# Patient Record
Sex: FEMALE | Race: WHITE | Employment: FULL TIME | URBAN - METROPOLITAN AREA
[De-identification: names, ages, dates, MRNs, and addresses within clinical notes are randomized per-mention and may not be internally consistent; named-entity substitution may affect disease eponyms.]

---

## 2020-07-01 ENCOUNTER — HOSPITAL ENCOUNTER (EMERGENCY)
Facility: CLINIC | Age: 50
Discharge: HOME | End: 2020-07-01
Attending: EMERGENCY MEDICINE
Payer: COMMERCIAL

## 2020-07-01 VITALS
HEART RATE: 104 BPM | TEMPERATURE: 98.3 F | HEIGHT: 63 IN | SYSTOLIC BLOOD PRESSURE: 143 MMHG | OXYGEN SATURATION: 100 % | RESPIRATION RATE: 14 BRPM | WEIGHT: 144 LBS | DIASTOLIC BLOOD PRESSURE: 99 MMHG | BODY MASS INDEX: 25.52 KG/M2

## 2020-07-01 PROCEDURE — 99283 EMERGENCY DEPT VISIT LOW MDM: CPT

## 2020-07-01 PROCEDURE — 6370000000 HC RX 637 (ALT 250 FOR IP): Performed by: EMERGENCY MEDICINE

## 2020-07-01 RX ORDER — ACETAMINOPHEN 120 MG/1
120 SUPPOSITORY RECTAL EVERY 4 HOURS PRN
COMMUNITY

## 2020-07-01 RX ORDER — PSEUDOEPHEDRINE HCL 30 MG/5 ML
15 LIQUID (ML) ORAL 4 TIMES DAILY PRN
COMMUNITY

## 2020-07-01 RX ORDER — IBUPROFEN 200 MG
400 TABLET ORAL EVERY 6 HOURS PRN
COMMUNITY

## 2020-07-01 RX ORDER — LORATADINE 10 MG/1
10 CAPSULE, LIQUID FILLED ORAL DAILY
COMMUNITY

## 2020-07-01 RX ADMIN — FLUORESCEIN SODIUM 1 EACH: 0.6 STRIP OPHTHALMIC at 20:10

## 2020-07-01 ASSESSMENT — PAIN DESCRIPTION - DESCRIPTORS: DESCRIPTORS: SHARP;JABBING

## 2020-07-01 ASSESSMENT — PAIN DESCRIPTION - PAIN TYPE: TYPE: ACUTE PAIN

## 2020-07-01 ASSESSMENT — PAIN DESCRIPTION - ORIENTATION: ORIENTATION: RIGHT

## 2020-07-01 ASSESSMENT — PAIN DESCRIPTION - FREQUENCY: FREQUENCY: INTERMITTENT

## 2020-07-01 ASSESSMENT — VISUAL ACUITY
OU: 20/20
OS: 20/30
OD: 20/40

## 2020-07-01 ASSESSMENT — PAIN SCALES - GENERAL: PAINLEVEL_OUTOF10: 3

## 2020-07-01 ASSESSMENT — PAIN DESCRIPTION - LOCATION: LOCATION: EYE

## 2020-07-02 NOTE — ED PROVIDER NOTES
eMERGENCY dEPARTMENT eNCOUnter      Pt Name: Juan Connolly  MRN: [id number]  [place] 1970  Date of evaluation: 7/1/2020      CHIEF COMPLAINT       Chief Complaint   Patient presents with   [address] [facility]    Juan Connolly is a 44 y.o. female who presents with right eye pain. Patient states about an hour and a half prior presentation. She had been pulling out the sole and cactus cleaning up the yard. She states she was sweating a lot abrupt her forearm against her brow of her head. She states she felt something was in her eye at that time she had irrigated her eye at home. She still states on occasion when she looks a certain direction or moves a certain direction with her eyes. She feels like there is something there, although generally she is not having pain. No blurry vision or double vision. She was wearing contacts at the time, which she has taken out        REVIEW OF SYSTEMS       Positive for right thigh pain. Negative for vision, double vision     PAST MEDICAL HISTORY    has a past medical history of H/O lumbar discectomy, Kidney stone, and Melanoma ([facility] [address] SURGICAL HISTORY      has a past surgical history that includes Hysterectomy; Finger amputation; and Tunneled venous port placement.     CURRENT MEDICATIONS       Discharge Medication List as of 7/1/2020  8:14 PM      CONTINUE these medications which have NOT CHANGED    Details   acetaminophen (TYLENOL) 120 MG suppository Place 120 mg rectally every 4 hours as needed for FeverHistorical Med      ibuprofen (ADVIL;MOTRIN) 200 MG tablet Take 400 mg by mouth every 6 hours as needed for PainHistorical Med      loratadine (CLARITIN) 10 MG capsule Take 10 mg by mouth dailyHistorical Med      pseudoephedrine (SUDAFED) 30 MG/5ML syrup Take 15 mg by mouth 4 times daily as neededHistorical Med             ALLERGIES     is allergic to ciprofibrate; codeine; compazine [prochlorperazine]; doxycycline; percocet [oxycodone-acetaminophen]; propofol; and soma [carisoprodol]. FAMILY HISTORY     has no family status information on file. family history is not on file. SOCIAL HISTORY      reports that she has never smoked. She has never used smokeless tobacco. She reports current alcohol use. She reports that she does not use drugs. PHYSICAL EXAM     INITIAL VITALS:  height is 5' 3\" (1.6 m) and weight is 65.3 kg (144 lb). Her temporal temperature is 98.3 °F (36.8 °C). Her blood pressure is 143/99 (abnormal) and her pulse is 104. Her respiration is 14 and oxygen saturation is 100%. Gen.: Patient is alert. No apparent distress. HEENT: Head is atraumatic. Conjunctiva are clear. Pupils are equal, reactive. Slit-lamp examination of the right eye reveals no foreign bodies. Anterior chamber, normal staining the eye revealed no increased uptake. Lid eversion both upper and lower revealed no foreign bodies that could be identified    DIFFERENTIAL DIAGNOSIS/ MDM:     Eye pain, eye foreign body    DIAGNOSTIC RESULTS       RADIOLOGY:   I directly visualized the following  images and reviewed the radiologist interpretations:  No orders to display         LABS:  Labs Reviewed - No data to display      EMERGENCY DEPARTMENT COURSE:   Vitals:    Vitals:    07/01/20 1940   BP: (!) 143/99   Pulse: 104   Resp: 14   Temp: 98.3 °F (36.8 °C)   TempSrc: Temporal   SpO2: 100%   Weight: 65.3 kg (144 lb)   Height: 5' 3\" (1.6 m)     -------------------------  BP: (!) 143/99, Temp: 98.3 °F (36.8 °C), Pulse: 104, Resp: 14    Orders Placed This Encounter   Medications    fluorescein ophthalmic strip 1 each           Re-evaluation Notes    At this time, I can identify. No foreign bodies. She has no indication of corneal abrasions, no gross abnormalities Center home with early follow-up to ophthalmology or optometrist in the morning if pain or symptoms continue. Return if worse        FINAL IMPRESSION      1.  Acute right eye pain DISPOSITION/PLAN   DISPOSITION Decision To Discharge 07/01/2020 08:13:00 PM      Condition on Disposition    Stable    PATIENT REFERRED TO:  Azar De La Fuente MD  [place] [address]  [phone]    In 1 day        DISCHARGE MEDICATIONS:  Discharge Medication List as of 7/1/2020  8:14 PM          (Please note that portions of this note were completed with a voice recognition program.  Efforts were made to edit the dictations but occasionally words are mis-transcribed.)    Rothman MD, F.A.C.E.P.   Attending Emergency Physician        Ani Lerner MD  07/02/20 1025

## 2020-07-02 NOTE — ED NOTES
Pt arrived at ED with c/o possibly getting a thistle or cactus thorn in her right eye while pulling thistles and cactus out. Slight redness noted to right eye Skin warm and dry. Respirations non-labored.      Yohana Guajardo RN  07/01/20 2002

## 2023-11-07 ENCOUNTER — TELEPHONE (OUTPATIENT)
Age: 53
End: 2023-11-07

## 2023-11-07 DIAGNOSIS — E55.9 VITAMIN D DEFICIENCY: ICD-10-CM

## 2023-11-07 DIAGNOSIS — R53.83 FATIGUE, UNSPECIFIED TYPE: ICD-10-CM

## 2023-11-07 DIAGNOSIS — Z00.00 WELL ADULT EXAM: Primary | ICD-10-CM

## 2023-11-07 NOTE — TELEPHONE ENCOUNTER
Patient  called  and  she  needs  BW  done  she  has  order  from  other  doctor   CBC/A1c/Lipid/TSH  Wants  Vitamin D from us  and  anything  else  you  want  faxed  Cecil Huddleston   [phone]

## 2023-11-14 LAB
AVERAGE GLUCOSE: 108
BASOPHILS ABSOLUTE: NORMAL
BASOPHILS RELATIVE PERCENT: NORMAL
CHOLESTEROL, FASTING: 196
EOSINOPHILS ABSOLUTE: NORMAL
EOSINOPHILS RELATIVE PERCENT: NORMAL
HBA1C MFR BLD: 5.4 %
HCT VFR BLD CALC: 40 % (ref 36–46)
HDLC SERPL-MCNC: 52 MG/DL (ref 35–70)
HEMOGLOBIN: 12.9 G/DL (ref 12–16)
LDL CHOLESTEROL CALCULATED: 122 MG/DL (ref 0–160)
LYMPHOCYTES ABSOLUTE: NORMAL
LYMPHOCYTES RELATIVE PERCENT: NORMAL
MCH RBC QN AUTO: NORMAL PG
MCHC RBC AUTO-ENTMCNC: NORMAL G/DL
MCV RBC AUTO: NORMAL FL
MONOCYTES ABSOLUTE: NORMAL
MONOCYTES RELATIVE PERCENT: NORMAL
NEUTROPHILS ABSOLUTE: NORMAL
NEUTROPHILS RELATIVE PERCENT: NORMAL
PLATELET # BLD: 302 K/ΜL
PMV BLD AUTO: NORMAL FL
RBC # BLD: 13.6 10^6/ΜL
TRIGLYCERIDE, FASTING: 109
TSH SERPL DL<=0.05 MIU/L-ACNC: 2.59 UIU/ML
VITAMIN D 25-HYDROXY: 66.5
VITAMIN D2, 25 HYDROXY: NORMAL
VITAMIN D3,25 HYDROXY: NORMAL
WBC # BLD: 5.78 10^3/ML

## 2023-11-15 ENCOUNTER — TELEPHONE (OUTPATIENT)
Age: 53
End: 2023-11-15

## 2023-11-15 DIAGNOSIS — Z00.00 WELL ADULT EXAM: ICD-10-CM

## 2023-11-15 DIAGNOSIS — E55.9 VITAMIN D DEFICIENCY: ICD-10-CM

## 2023-11-15 NOTE — TELEPHONE ENCOUNTER
Patient dropped off wellness form that needs signed, form put in Dr's rack.  Would like form faxed to number on form when done, and trupti when sent [phone]

## 2023-11-16 DIAGNOSIS — Z00.00 WELL ADULT EXAM: ICD-10-CM

## 2023-11-16 DIAGNOSIS — E55.9 VITAMIN D DEFICIENCY: ICD-10-CM

## 2023-11-16 NOTE — TELEPHONE ENCOUNTER
Completed form faxed per request.  Form scanned/attached to this note. L/m informing patient form ready for . Form in binder at check-in.

## 2024-04-25 ENCOUNTER — TELEPHONE (OUTPATIENT)
Age: 54
End: 2024-04-25

## 2024-04-25 DIAGNOSIS — E11.9 CONTROLLED TYPE 2 DIABETES MELLITUS WITHOUT COMPLICATION, WITHOUT LONG-TERM CURRENT USE OF INSULIN (HCC): ICD-10-CM

## 2024-04-25 DIAGNOSIS — R53.83 FATIGUE, UNSPECIFIED TYPE: ICD-10-CM

## 2024-04-25 DIAGNOSIS — Z00.00 WELL ADULT EXAM: Primary | ICD-10-CM

## 2024-04-25 DIAGNOSIS — E55.9 VITAMIN D DEFICIENCY: ICD-10-CM

## 2024-04-25 NOTE — TELEPHONE ENCOUNTER
Patient is calling requesting labs for her new insurance and her port flush on this Tuesday so she would like to have the blood work done to if possible please add Thyroid [phone] is the fax number where she would like them to go.

## 2024-04-30 LAB
BASOPHILS ABSOLUTE: 0.08 /ΜL
BASOPHILS RELATIVE PERCENT: 1.4 %
CHOLESTEROL, TOTAL: 199 MG/DL
CHOLESTEROL/HDL RATIO: 3.9
EOSINOPHILS ABSOLUTE: 0.15 /ΜL
EOSINOPHILS RELATIVE PERCENT: 2.7 %
ESTIMATED AVERAGE GLUCOSE: 114
HBA1C MFR BLD: 5.6 %
HCT VFR BLD CALC: 40.9 % (ref 36–46)
HDLC SERPL-MCNC: 51 MG/DL (ref 35–70)
HEMOGLOBIN: 13.4 G/DL (ref 12–16)
LDL CHOLESTEROL CALCULATED: 133 MG/DL (ref 0–160)
LYMPHOCYTES ABSOLUTE: 1.63 /ΜL
LYMPHOCYTES RELATIVE PERCENT: 29 %
MCH RBC QN AUTO: 28.9 PG
MCHC RBC AUTO-ENTMCNC: 32.8 G/DL
MCV RBC AUTO: 88.1 FL
MONOCYTES ABSOLUTE: 0.39 /ΜL
MONOCYTES RELATIVE PERCENT: 6.9 %
NEUTROPHILS ABSOLUTE: 3.37 /ΜL
NEUTROPHILS RELATIVE PERCENT: 59.8 %
NONHDLC SERPL-MCNC: NORMAL MG/DL
PDW BLD-RTO: 14 %
PLATELET # BLD: 308 K/ΜL
PMV BLD AUTO: NORMAL FL
RBC # BLD: 4.64 10^6/ΜL
T4 FREE: 0.7
TRIGL SERPL-MCNC: 76 MG/DL
TSH SERPL DL<=0.05 MIU/L-ACNC: 1.87 UIU/ML
VITAMIN D 25-HYDROXY: 67.5
VITAMIN D2, 25 HYDROXY: NORMAL
VITAMIN D3,25 HYDROXY: NORMAL
VLDLC SERPL CALC-MCNC: 15 MG/DL
WBC # BLD: 5.63 10^3/ML

## 2024-05-01 DIAGNOSIS — E07.9 THYROID DYSFUNCTION: Primary | ICD-10-CM

## 2024-05-01 DIAGNOSIS — M25.50 ARTHRALGIA, UNSPECIFIED JOINT: ICD-10-CM

## 2024-05-01 DIAGNOSIS — E53.8 VITAMIN B12 DEFICIENCY: ICD-10-CM

## 2024-05-01 DIAGNOSIS — E61.1 IRON DEFICIENCY: ICD-10-CM

## 2024-05-01 DIAGNOSIS — L65.9 HAIR LOSS: ICD-10-CM

## 2024-05-01 DIAGNOSIS — N95.1 MENOPAUSAL AND FEMALE CLIMACTERIC STATES: ICD-10-CM

## 2024-05-01 NOTE — PROGRESS NOTES
Please see if attached lab orders can be added onto lab draw done yesterday , I think at [facility]? Not sure if you can tell from results/care everywhere section???retask to me with response and fax orders

## 2024-05-03 NOTE — PROGRESS NOTES
Tried to call medical records at [facility] to get the lab results but you have to fax a request to [phone].  Request has been faxed. 05/03/2024

## 2024-05-03 NOTE — PROGRESS NOTES
Please call [facility] to get results of the lab orders that were added onto lab draw that was done there a few days ago

## 2024-05-06 ENCOUNTER — TELEPHONE (OUTPATIENT)
Age: 54
End: 2024-05-06

## 2024-05-06 NOTE — TELEPHONE ENCOUNTER
Patient was supposed to get a second set of labs added to some labs she already had done. Unfortunately the lab didn't get the 2nd order before they got rid of her sample    Patient is planning on getting labs drawn tomorrow on her day off.  She plans on going to a [facility]     Not really sure what the extra labs were - She thinks the orders were for T3, hormone, auto immune -     Advised Dr DUMAS gone for the day - so will send to provider in office

## 2024-05-06 NOTE — PROGRESS NOTES
Received message that lab was not able to add on the below orders that we faxed last week so she has to go for another blood draw tomorrow.  She wanted to get this done tomorrow, the lab orders should already be in [facility] system, she does not have to fast for it but please get it before 9 AM.  These are the orders for reference but they are attached to my previous  task which you should be able reference below    Active    Thyroid Peroxidase Antibody Ordered On: 05/01/2024   Iron and TIBC Ordered On: 05/01/2024   POLINA Screen with Reflex Ordered On: 05/01/2024   Sedimentation Rate Ordered On: 05/01/2024   Estradiol Ordered On: 05/01/2024   Progesterone Ordered On: 05/01/2024   DHEA-Sulfate Ordered On: 05/01/2024   Testosterone Free & Bio, Total Ordered On: 05/01/2024   Vitamin B12 Ordered On: 05/01/2024   T3, Free Ordered On: 05/01/2024

## 2024-05-06 NOTE — PROGRESS NOTES
Notified patient labs are in her chart and that she did not have to fast for these but to please get them before 9 am, patient states that she can do that and will go to the lab on pray

## 2024-05-06 NOTE — TELEPHONE ENCOUNTER
Notified patient that labs are in chart, she does not have to fast for these but to please get them before 9 am, patient understood and states that she can do that

## 2024-05-07 ENCOUNTER — HOSPITAL ENCOUNTER (OUTPATIENT)
Age: 54
Setting detail: SPECIMEN
Discharge: HOME | End: 2024-05-07

## 2024-05-07 DIAGNOSIS — E07.9 THYROID DYSFUNCTION: ICD-10-CM

## 2024-05-07 DIAGNOSIS — L65.9 HAIR LOSS: ICD-10-CM

## 2024-05-07 DIAGNOSIS — N95.1 MENOPAUSAL AND FEMALE CLIMACTERIC STATES: ICD-10-CM

## 2024-05-07 DIAGNOSIS — E61.1 IRON DEFICIENCY: ICD-10-CM

## 2024-05-07 DIAGNOSIS — M25.50 ARTHRALGIA, UNSPECIFIED JOINT: ICD-10-CM

## 2024-05-07 LAB
DHEA-S SERPL-MCNC: 104 UG/DL (ref 18.9–205)
ERYTHROCYTE [SEDIMENTATION RATE] IN BLOOD BY PHOTOMETRIC METHOD: 4 MM/HR (ref 0–30)
ESTRADIOL LEVEL: 23 PG/ML
IRON SATN MFR SERPL: 10 % (ref 20–55)
IRON SERPL-MCNC: 37 UG/DL (ref 37–145)
PROGEST SERPL-MCNC: 0.5 NG/ML
T3FREE SERPL-MCNC: 3 PG/ML (ref 2–4.4)
TIBC SERPL-MCNC: 370 UG/DL (ref 250–450)
UNSATURATED IRON BINDING CAPACITY: 333 UG/DL (ref 112–347)

## 2024-05-10 LAB
ANA SER QL IA: NEGATIVE
DSDNA IGG SER QL IA: 1.4 IU/ML
NUCLEAR IGG SER IA-RTO: 0.1 U/ML
THYROPEROXIDASE AB SERPL IA-ACNC: <4 IU/ML (ref 0–25)

## 2024-08-07 ENCOUNTER — TELEPHONE (OUTPATIENT)
Age: 54
End: 2024-08-07

## 2024-08-07 DIAGNOSIS — N95.2 POSTMENOPAUSAL ATROPHIC VAGINITIS: Primary | ICD-10-CM

## 2024-08-07 RX ORDER — ESTRADIOL 0.1 MG/G
1 CREAM VAGINAL
Qty: 42.5 G | Refills: 3 | Status: SHIPPED | OUTPATIENT
Start: 2024-08-07

## 2024-08-07 NOTE — TELEPHONE ENCOUNTER
[facility] pharmacy in [place] called requesting a refill of Estradiol. Patient previously went to [facility].

## 2024-10-22 ENCOUNTER — TELEPHONE (OUTPATIENT)
Age: 54
End: 2024-10-22

## 2024-10-22 DIAGNOSIS — N95.2 POSTMENOPAUSAL ATROPHIC VAGINITIS: Primary | ICD-10-CM

## 2024-10-22 RX ORDER — ESTRADIOL 10 UG/1
10 INSERT VAGINAL
Qty: 24 TABLET | Refills: 1 | Status: SHIPPED | OUTPATIENT
Start: 2024-10-24

## 2024-10-22 NOTE — TELEPHONE ENCOUNTER
Lynnette from [facility] called requesting new Rx be sent for vagifem tablets as a 90-day supply, total would be for 24 tablets, 1 tablet twice a week, Lynnette states if you have any questions you can call the pharmacy - can Rx please be sent?

## 2024-11-18 DIAGNOSIS — N95.2 ATROPHIC VAGINITIS: Primary | ICD-10-CM

## 2024-11-18 PROBLEM — E11.9 CONTROLLED TYPE 2 DIABETES MELLITUS WITHOUT COMPLICATION, WITHOUT LONG-TERM CURRENT USE OF INSULIN (HCC): Status: ACTIVE | Noted: 2024-11-18

## 2024-11-18 NOTE — PROGRESS NOTES
How do I remove DM2 diagnosis from pt's 'Visit Dx' list history? She does not have this diagnosis, somehow she can see it on her side of my chart

## 2024-11-20 ENCOUNTER — TELEPHONE (OUTPATIENT)
Age: 54
End: 2024-11-20

## 2024-11-20 RX ORDER — SCOLOPAMINE TRANSDERMAL SYSTEM 1 MG/1
1 PATCH, EXTENDED RELEASE TRANSDERMAL
Qty: 4 PATCH | Refills: 0 | Status: SHIPPED | OUTPATIENT
Start: 2024-11-20

## 2024-11-20 NOTE — TELEPHONE ENCOUNTER
Patient is going on a 7 day cruise and is wondering if you would send in a prescription for the motion sickness patch for her like you have in the past. She leaves Saturday morning. Please send to [facility] [address] .

## 2024-11-22 NOTE — PROGRESS NOTES
Epic  emailed me back and advised:   This diagnosis is being pulled in via [employer]. This is not a diagnosis in our system so we are not able to edit/correct this.  The patient will need to contact [facility] to have this fixed.     Do you want us to call and advise this to patient?

## 2024-11-27 NOTE — PROGRESS NOTES
I called patient and tried to explain this to her. She told me no [facility] told her that dr. Champagne put it on with her march labs, when I looked up the labs I advised she didn't have any march labs she had April labs and none of them had that dx on the orders. So for her to reach out to [facility] to see what they can do to remove it.   She advised she would at a later time.   She advised that they are currently at [facility] with  and they believe that he has bels palsy and they will need to follow up with you.

## 2024-12-16 ENCOUNTER — OFFICE VISIT (OUTPATIENT)
Age: 54
End: 2024-12-16
Payer: COMMERCIAL

## 2024-12-16 ENCOUNTER — HOSPITAL ENCOUNTER (OUTPATIENT)
Age: 54
Setting detail: SPECIMEN
Discharge: HOME | End: 2024-12-16

## 2024-12-16 VITALS
OXYGEN SATURATION: 99 % | DIASTOLIC BLOOD PRESSURE: 91 MMHG | HEART RATE: 92 BPM | TEMPERATURE: 97.1 F | SYSTOLIC BLOOD PRESSURE: 156 MMHG

## 2024-12-16 DIAGNOSIS — N39.0 FREQUENT UTI: Primary | ICD-10-CM

## 2024-12-16 DIAGNOSIS — N39.0 FREQUENT UTI: ICD-10-CM

## 2024-12-16 LAB
BILIRUBIN, POC: 1
BLOOD URINE, POC: ABNORMAL
CLARITY, POC: ABNORMAL
COLOR, POC: ABNORMAL
GLUCOSE URINE, POC: ABNORMAL MG/DL
KETONES, POC: ABNORMAL MG/DL
LEUKOCYTE EST, POC: ABNORMAL
NITRITE, POC: ABNORMAL
PH, POC: 7
PROTEIN, POC: 30 MG/DL
SPECIFIC GRAVITY, POC: 1
UROBILINOGEN, POC: 1 MG/DL

## 2024-12-16 PROCEDURE — 81003 URINALYSIS AUTO W/O SCOPE: CPT | Performed by: NURSE PRACTITIONER

## 2024-12-16 PROCEDURE — 99213 OFFICE O/P EST LOW 20 MIN: CPT | Performed by: NURSE PRACTITIONER

## 2024-12-16 RX ORDER — NITROFURANTOIN 25; 75 MG/1; MG/1
100 CAPSULE ORAL 2 TIMES DAILY
Qty: 20 CAPSULE | Refills: 0 | Status: SHIPPED | OUTPATIENT
Start: 2024-12-16 | End: 2024-12-26

## 2024-12-16 RX ORDER — MELOXICAM 15 MG/1
15 TABLET ORAL DAILY
COMMUNITY

## 2024-12-16 RX ORDER — ALBUTEROL SULFATE 90 UG/1
2 INHALANT RESPIRATORY (INHALATION) 4 TIMES DAILY
COMMUNITY

## 2024-12-16 RX ORDER — MV-MIN/FOLIC/VIT K/LYCOP/COQ10 200-100MCG
CAPSULE ORAL
COMMUNITY

## 2024-12-16 NOTE — PROGRESS NOTES
[facility] UROGYNECOLOGY AND PELVIC REHABILITATION   [address] [place] [address]  Dept: [phone]   Patient:  Ale Menendez   :  1970   Visit Date:  2024     VISIT - UTI     CC: had concerns including Frequent/Recurrent UTI.    Chaperone present for entire visit and pertinent physical exam: None Required    HPI: Pt reports since Saturday bladder heavy, urethral discomfort, urgency/frequency, dysuria.  Started uricalm. Last UTI years ago. Vagifem for atrophy.  History of cervical dysplasia and VAIN 1. Hysterectomy with BSO, internall [place] culdoplasty, cysto with bilateral ureteral stents, RA adhesiolysis ([facility])  2019.    Symptoms, onset of symptoms, progression of symptoms, severity of pain, cloudy urine, hematuria, frequency / urgency, flank pain, and current / prior treatments and cultures if available were reviewed.    Suprapubic pain: Yes  Flank pain: No  Cloudy urine: No  Odiferous urine: No  Hematuria: No  Fever: No  Confusion: No      PHYSICAL EXAM:  BP (!) 156/91 (Site: Right Upper Arm, Position: Sitting, Cuff Size: Medium Adult)   Pulse 92   Temp 97.1 °F (36.2 °C) (Temporal)   SpO2 99%     Physical Exam  Constitutional:       Appearance: Normal appearance. She is normal weight.   HENT:      Head: Normocephalic and atraumatic.      Right Ear: Tympanic membrane normal.      Left Ear: Tympanic membrane normal.      Nose: Nose normal.      Mouth/Throat:      Mouth: Mucous membranes are moist.      Pharynx: Oropharynx is clear.   Eyes:      Extraocular Movements: Extraocular movements intact.      Conjunctiva/sclera: Conjunctivae normal.   Cardiovascular:      Rate and Rhythm: Normal rate and regular rhythm.   Pulmonary:      Effort: Pulmonary effort is normal.      Breath sounds: Normal breath sounds.   Abdominal:      General: Abdomen is flat. Bowel sounds are normal.

## 2024-12-18 LAB
MICROORGANISM SPEC CULT: ABNORMAL
SERVICE CMNT-IMP: ABNORMAL
SPECIMEN DESCRIPTION: ABNORMAL

## 2025-02-10 NOTE — PROGRESS NOTES
sigmoidoscopy on file      Screenings due will be ordered for this visit per patient request.    ROS:  Review of Systems   All other systems reviewed and are negative.       All other systems negative.    PE:  BP (!) 158/91 (Site: Right Upper Arm, Position: Sitting, Cuff Size: Medium Adult)   Pulse 93   Wt 65.3 kg (144 lb)   SpO2 99%   BMI 25.51 kg/m²   Objective      Physical Exam  Constitutional:       Appearance: Normal appearance. She is normal weight.   Genitourinary:      Bladder, rectum and urethral meatus normal.      No lesions in the vagina.      Genitourinary Comments: Flat brown freckles unchanged per patient, just evaluated by derm      Right Labia: No rash, lesions or Bartholin's cyst.     Left Labia: No lesions, Bartholin's cyst or rash.     No inguinal adenopathy present in the right or left side.     Vaginal cuff intact.     No vaginal tenderness.      No vaginal prolapse present.     No vaginal atrophy present.       Right Adnexa: not tender and no mass present.     Left Adnexa: not tender and no mass present.     Cervix is absent.      No urethral tenderness or mass present.      Pelvic Floor comments: Pelvic cavity: Transvaginal digital palpation and Kegel's squeeze palpable.  Myalgia and myositis of the pelvic floor not present to contra-indicate a procedure.  No significant complications or exposures of any prior placed mesh.  No significant pelvic organ prolapse contraindicating a procedure..     Pelvic floor neuro is intact.  Rectum:      No rectal mass.   Breasts:     Right: Normal. No swelling, mass, nipple discharge, skin change or tenderness.      Left: Normal. No swelling, mass, nipple discharge, skin change or tenderness.      Breast exam comments: Bilateral nipples slightly inverted, unchanged per pt.  HENT:      Head: Normocephalic and atraumatic.      Right Ear: Tympanic membrane normal.      Left Ear: Tympanic membrane normal.      Nose: Nose normal.      Mouth/Throat:

## 2025-02-12 ENCOUNTER — OFFICE VISIT (OUTPATIENT)
Age: 55
End: 2025-02-12
Payer: COMMERCIAL

## 2025-02-12 ENCOUNTER — HOSPITAL ENCOUNTER (OUTPATIENT)
Age: 55
Setting detail: SPECIMEN
Discharge: HOME | End: 2025-02-12

## 2025-02-12 VITALS
HEART RATE: 93 BPM | OXYGEN SATURATION: 99 % | WEIGHT: 144 LBS | BODY MASS INDEX: 25.51 KG/M2 | DIASTOLIC BLOOD PRESSURE: 91 MMHG | SYSTOLIC BLOOD PRESSURE: 158 MMHG

## 2025-02-12 DIAGNOSIS — N95.2 POSTMENOPAUSAL ATROPHIC VAGINITIS: Primary | ICD-10-CM

## 2025-02-12 DIAGNOSIS — Z01.419 ENCOUNTER FOR ANNUAL ROUTINE GYNECOLOGICAL EXAMINATION: ICD-10-CM

## 2025-02-12 DIAGNOSIS — Z87.411 HX VAGINAL DYSPLASIA: ICD-10-CM

## 2025-02-12 DIAGNOSIS — Z87.410 HISTORY OF CERVICAL DYSPLASIA: ICD-10-CM

## 2025-02-12 PROCEDURE — 99396 PREV VISIT EST AGE 40-64: CPT | Performed by: NURSE PRACTITIONER

## 2025-02-12 RX ORDER — ESTRADIOL 10 UG/1
10 INSERT VAGINAL
Qty: 24 TABLET | Refills: 3 | Status: SHIPPED | OUTPATIENT
Start: 2025-02-13

## 2025-02-14 LAB
HPV I/H RISK 4 DNA CVX QL NAA+PROBE: NOT DETECTED
HPV SAMPLE: NORMAL
HPV, INTERPRETATION: NORMAL
HPV16 DNA CVX QL NAA+PROBE: NOT DETECTED
HPV18 DNA CVX QL NAA+PROBE: NOT DETECTED
SPECIMEN DESCRIPTION: NORMAL

## 2025-02-24 LAB — CYTOLOGY REPORT: NORMAL

## 2025-03-03 NOTE — PROGRESS NOTES
membrane normal.      Nose: Nose normal.      Mouth/Throat:      Mouth: Mucous membranes are moist.      Pharynx: Oropharynx is clear.   Eyes:      Extraocular Movements: Extraocular movements intact.      Conjunctiva/sclera: Conjunctivae normal.   Cardiovascular:      Rate and Rhythm: Normal rate and regular rhythm.   Pulmonary:      Effort: Pulmonary effort is normal.      Breath sounds: Normal breath sounds.   Abdominal:      General: Abdomen is flat. Bowel sounds are normal.      Tenderness: There is no right CVA tenderness or left CVA tenderness.      Hernia: There is no hernia in the left inguinal area or right inguinal area.   Musculoskeletal:         General: Normal range of motion.      Cervical back: Normal range of motion and neck supple.   Lymphadenopathy:      Upper Body:      Right upper body: No supraclavicular or axillary adenopathy.      Left upper body: No supraclavicular or axillary adenopathy.      Lower Body: No right inguinal adenopathy. No left inguinal adenopathy.   Neurological:      Mental Status: She is oriented to person, place, and time.   Psychiatric:         Mood and Affect: Mood normal.         Behavior: Behavior normal.     VISIT RESULTS:  Colposcopy under high-power magnification with green filter fails to reveal any clitoral labial vaginal or perianal lesions.  Acetic acid used and cleansed with saline afterwards.  Patient tolerated well.    ASSESSMENT/PLAN:  Assessment & Plan         Postmenopausal atrophic vaginitis  History of cervical dysplasia  Hx vaginal dysplasia       The patient was counseled regarding review of all conditions discussed.     Return to yearly Pap smears.  If her Paps remain ASCUS due to atrophy no need to repeat colposcopy.  Use annual exam to look for new lesions.    Multiple records reviewed. All questions were addressed to the patient's satisfaction.    Additional counseling time (minutes) spent regarding today's visit:  15 Performing:

## 2025-03-04 ENCOUNTER — PROCEDURE VISIT (OUTPATIENT)
Age: 55
End: 2025-03-04
Payer: COMMERCIAL

## 2025-03-04 VITALS — OXYGEN SATURATION: 96 % | SYSTOLIC BLOOD PRESSURE: 159 MMHG | HEART RATE: 84 BPM | DIASTOLIC BLOOD PRESSURE: 88 MMHG

## 2025-03-04 DIAGNOSIS — Z87.411 HX VAGINAL DYSPLASIA: ICD-10-CM

## 2025-03-04 DIAGNOSIS — Z87.410 HISTORY OF CERVICAL DYSPLASIA: ICD-10-CM

## 2025-03-04 DIAGNOSIS — N95.2 POSTMENOPAUSAL ATROPHIC VAGINITIS: Primary | ICD-10-CM

## 2025-03-04 LAB
CONTROL: NORMAL
PREGNANCY TEST URINE, POC: NEGATIVE

## 2025-03-04 PROCEDURE — 81025 URINE PREGNANCY TEST: CPT | Performed by: OBSTETRICS & GYNECOLOGY

## 2025-03-04 PROCEDURE — 57420 EXAM OF VAGINA W/SCOPE: CPT | Performed by: OBSTETRICS & GYNECOLOGY

## 2025-03-04 PROCEDURE — 99213 OFFICE O/P EST LOW 20 MIN: CPT | Performed by: OBSTETRICS & GYNECOLOGY

## 2025-03-04 RX ORDER — FLUTICASONE PROPIONATE 50 MCG
SPRAY, SUSPENSION (ML) NASAL
COMMUNITY

## 2025-03-04 RX ORDER — ACETAMINOPHEN 500 MG
500 TABLET ORAL EVERY 6 HOURS PRN
COMMUNITY

## 2025-03-04 RX ORDER — ESTRADIOL 10 UG/1
INSERT VAGINAL
COMMUNITY

## 2025-03-19 ENCOUNTER — TELEPHONE (OUTPATIENT)
Age: 55
End: 2025-03-19

## 2025-03-19 NOTE — TELEPHONE ENCOUNTER
Pt was in for colp appt on 3/4/25, and urine preg was done by accident.  Pt had a hyst and did not need this testing. Can we please send to billing

## 2025-05-02 RX ORDER — SULFACETAMIDE SODIUM 100 MG/ML
2 SOLUTION/ DROPS OPHTHALMIC 4 TIMES DAILY
Qty: 10 ML | Refills: 0 | Status: SHIPPED | OUTPATIENT
Start: 2025-05-02 | End: 2025-05-12

## 2025-07-22 ENCOUNTER — OFFICE VISIT (OUTPATIENT)
Age: 55
End: 2025-07-22
Payer: COMMERCIAL

## 2025-07-22 ENCOUNTER — HOSPITAL ENCOUNTER (OUTPATIENT)
Age: 55
Setting detail: SPECIMEN
Discharge: HOME | End: 2025-07-22

## 2025-07-22 VITALS
OXYGEN SATURATION: 95 % | TEMPERATURE: 98.4 F | SYSTOLIC BLOOD PRESSURE: 145 MMHG | DIASTOLIC BLOOD PRESSURE: 90 MMHG | HEART RATE: 90 BPM

## 2025-07-22 DIAGNOSIS — Z87.442 HISTORY OF KIDNEY STONES: ICD-10-CM

## 2025-07-22 DIAGNOSIS — R39.9 UTI SYMPTOMS: ICD-10-CM

## 2025-07-22 DIAGNOSIS — R35.0 URINARY FREQUENCY: Primary | ICD-10-CM

## 2025-07-22 LAB
BILIRUBIN, POC: 1
BLOOD URINE, POC: 50
CLARITY, POC: NORMAL
COLOR, POC: NORMAL
GLUCOSE URINE, POC: NORMAL MG/DL
KETONES, POC: NORMAL MG/DL
LEUKOCYTE EST, POC: NORMAL
NITRITE, POC: NORMAL
PH, POC: 7
PROTEIN, POC: 30 MG/DL
SPECIFIC GRAVITY, POC: 1.01
UROBILINOGEN, POC: 4 MG/DL

## 2025-07-22 PROCEDURE — 99213 OFFICE O/P EST LOW 20 MIN: CPT

## 2025-07-22 PROCEDURE — 81003 URINALYSIS AUTO W/O SCOPE: CPT

## 2025-07-22 RX ORDER — NITROFURANTOIN 25; 75 MG/1; MG/1
100 CAPSULE ORAL 2 TIMES DAILY
Qty: 14 CAPSULE | Refills: 0 | Status: SHIPPED | OUTPATIENT
Start: 2025-07-22 | End: 2025-07-29

## 2025-07-22 NOTE — PROGRESS NOTES
[facility] UROGYNECOLOGY AND PELVIC REHABILITATION   [address] [place] [address]  [address] [place] [address]  Dept: [phone]  Date: 7/22/2025  Patient Name: Ale Menendez    VISIT - UTI visit     CC: had concerns including Other (Uti symptom(s)/ rt flank discomfort, bladder spasms).        HPI:   History of Present Illness  The patient presents for evaluation of a suspected urinary tract infection (UTI).    She reports a sudden onset of severe pain on Sunday morning, escalating from mild discomfort to intense pain within an hour. Initially, she felt a twinge in her bladder and managed it with Urocalm. However, the bladder pain intensified, leading her to take two doses of Urocalm within three hours. She suspects that she has passed a kidney stone based on the pain and her history. She noticed some blood in her urine, although not as much as she typically observes when passing stones. By Sunday night, she felt better but experienced bladder spasms when she turned on her side, indicating that the stone had not yet fully passed. Her condition improved on Monday, but the pain returned by Monday night. Today, her condition is not as severe as it was on Sunday, but she still feels symptoms. She has not passed any stones this year and it has been approximately 8 to 10 years since her last episode. She recalls a previous incident where she had a 7 mm stone that resolved spontaneously. During that time, she was physically active, engaging in running and karate, and would often experience pain prison through her 2-mile run, followed by hematuria upon returning home. The pain would typically subside within three hours. This is her second UTI this calendar year, with the first one occurring in 12/2024. She has several allergies but tolerated the antibiotics prescribed during her last UTI without any adverse reactions. She was previously on a low dose of Macrobid

## 2025-07-24 LAB
MICROORGANISM SPEC CULT: ABNORMAL
SERVICE CMNT-IMP: ABNORMAL
SPECIMEN DESCRIPTION: ABNORMAL

## 2025-07-30 ENCOUNTER — COMMUNITY OUTREACH (OUTPATIENT)
Age: 55
End: 2025-07-30